# Patient Record
Sex: FEMALE | Race: WHITE | Employment: FULL TIME | URBAN - METROPOLITAN AREA
[De-identification: names, ages, dates, MRNs, and addresses within clinical notes are randomized per-mention and may not be internally consistent; named-entity substitution may affect disease eponyms.]

---

## 2017-01-13 ENCOUNTER — OFFICE VISIT (OUTPATIENT)
Dept: FAMILY MEDICINE CLINIC | Age: 27
End: 2017-01-13

## 2017-01-13 VITALS
HEIGHT: 69 IN | RESPIRATION RATE: 16 BRPM | BODY MASS INDEX: 28.58 KG/M2 | SYSTOLIC BLOOD PRESSURE: 122 MMHG | HEART RATE: 90 BPM | OXYGEN SATURATION: 98 % | DIASTOLIC BLOOD PRESSURE: 70 MMHG | TEMPERATURE: 100.6 F | WEIGHT: 193 LBS

## 2017-01-13 DIAGNOSIS — R68.89 FLU-LIKE SYMPTOMS: Primary | ICD-10-CM

## 2017-01-13 LAB
FLUAV+FLUBV AG NOSE QL IA.RAPID: NEGATIVE POS/NEG
FLUAV+FLUBV AG NOSE QL IA.RAPID: NEGATIVE POS/NEG
VALID INTERNAL CONTROL?: YES

## 2017-01-13 RX ORDER — HYDROCODONE BITARTRATE AND ACETAMINOPHEN 5; 325 MG/1; MG/1
TABLET ORAL
Qty: 12 TAB | Refills: 0 | Status: SHIPPED | OUTPATIENT
Start: 2017-01-13 | End: 2017-04-27 | Stop reason: ALTCHOICE

## 2017-01-13 RX ORDER — OSELTAMIVIR PHOSPHATE 75 MG/1
75 CAPSULE ORAL 2 TIMES DAILY
Qty: 10 CAP | Refills: 0 | Status: SHIPPED | OUTPATIENT
Start: 2017-01-13 | End: 2017-01-18

## 2017-01-13 NOTE — LETTER
NOTIFICATION RETURN TO WORK / SCHOOL 
 
1/13/2017 9:50 AM 
 
Ms. Caleb Cooper [address] [place] [address] To Whom It May Concern: 
 
Caleb Cooper is currently under the care of [address] [place] [address]. She will return to work/school on: 1/16/2017 If there are questions or concerns please have the patient contact our office. Sincerely, Juany Jean MD

## 2017-01-13 NOTE — PROGRESS NOTES
Toño German is a 31 y.o. female here for cold symptoms      1. Have you been to the ER, urgent care clinic or hospitalized since your last visit? NO.     2. Have you seen or consulted any other health care providers outside of the [address] [place] [address] since your last visit (Include any pap smears or colon screening)? NO      Do you have an Advanced Directive? NO    Would you like information on Advanced Directives?  NO

## 2017-01-13 NOTE — PATIENT INSTRUCTIONS
Rest, increase fluids, ibuprofen if needed for pain and fever in addition to hydrocodone-APAP if needed  Tamiflu twice daily x 5 days  Respiratory precautions  Follow up for new symptoms, worsening symptoms or failure to improve. Influenza (Flu): Care Instructions  Your Care Instructions  Influenza (flu) is an infection in the lungs and breathing passages. It is caused by the influenza virus. There are different strains, or types, of the flu virus from year to year. Unlike the common cold, the flu comes on suddenly and the symptoms, such as a cough, congestion, fever, chills, fatigue, aches, and pains, are more severe. These symptoms may last up to 10 days. Although the flu can make you feel very sick, it usually doesn't cause serious health problems. Home treatment is usually all you need for flu symptoms. But your doctor may prescribe antiviral medicine to prevent other health problems, such as pneumonia, from developing. Older people and those who have a long-term health condition, such as lung disease, are most at risk for having pneumonia or other health problems. Follow-up care is a key part of your treatment and safety. Be sure to make and go to all appointments, and call your doctor if you are having problems. Its also a good idea to know your test results and keep a list of the medicines you take. How can you care for yourself at home? · Get plenty of rest.  · Drink plenty of fluids, enough so that your urine is light yellow or clear like water. If you have kidney, heart, or liver disease and have to limit fluids, talk with your doctor before you increase the amount of fluids you drink. · Take an over-the-counter pain medicine if needed, such as acetaminophen (Tylenol), ibuprofen (Advil, Motrin), or naproxen (Aleve), to relieve fever, headache, and muscle aches. Read and follow all instructions on the label. No one younger than 20 should take aspirin.  It has been linked to Reye syndrome, a serious illness. · Do not smoke. Smoking can make the flu worse. If you need help quitting, talk to your doctor about stop-smoking programs and medicines. These can increase your chances of quitting for good. · Breathe moist air from a hot shower or from a sink filled with hot water to help clear a stuffy nose. · Before you use cough and cold medicines, check the label. These medicines may not be safe for young children or for people with certain health problems. · If the skin around your nose and lips becomes sore, put some petroleum jelly on the area. · To ease coughing:  ¨ Drink fluids to soothe a scratchy throat. ¨ Suck on cough drops or plain hard candy. ¨ Take an over-the-counter cough medicine that contains dextromethorphan to help you get some sleep. Read and follow all instructions on the label. ¨ Raise your head at night with an extra pillow. This may help you rest if coughing keeps you awake. · Take any prescribed medicine exactly as directed. Call your doctor if you think you are having a problem with your medicine. To avoid spreading the flu  · Wash your hands regularly, and keep your hands away from your face. · Stay home from school, work, and other public places until you are feeling better and your fever has been gone for at least 24 hours. The fever needs to have gone away on its own without the help of medicine. · Ask people living with you to talk to their doctors about preventing the flu. They may get antiviral medicine to keep from getting the flu from you. · To prevent the flu in the future, get a flu vaccine every fall. Encourage people living with you to get the vaccine. · Cover your mouth when you cough or sneeze. When should you call for help? Call 911 anytime you think you may need emergency care. For example, call if:  · You have severe trouble breathing. Call your doctor now or seek immediate medical care if:  · You have new or worse trouble breathing.   · You seem to be getting much sicker. · You feel very sleepy or confused. · You have a new or higher fever. · You get a new rash. Watch closely for changes in your health, and be sure to contact your doctor if:  · You begin to get better and then get worse. · You are not getting better after 1 week. Where can you learn more? Go to http://michael-berny.info/. Enter B538 in the search box to learn more about \"Influenza (Flu): Care Instructions. \"  Current as of: May 23, 2016  Content Version: 11.1  © 2749-0035 [employer]. Care instructions adapted under license by [employer] (which disclaims liability or warranty for this information). If you have questions about a medical condition or this instruction, always ask your healthcare professional. [address] any warranty or liability for your use of this information.

## 2017-01-13 NOTE — MR AVS SNAPSHOT
Visit Information Date & Time Provider Department Dept. Phone Encounter #  
 1[facility]13[facility]2017  9[phone] [id number] Upcoming Health Maintenance Date Due DTaP/Tdap/Td series (1 - Tdap) 3/28/2010 INFLUENZA AGE 9 TO ADULT 8/1/2016 PAP AKA CERVICAL CYTOLOGY 8/17/2019 Allergies as of 1/13/2017  Review Complete On: 1/13/2017 By: Jayson Mclaughlin MD  
  
 Severity Noted Reaction Type Reactions Other Plant, Animal, Environmental  07/19/2016    Ana Mariano Current Immunizations  Never Reviewed No immunizations on file. Not reviewed this visit You Were Diagnosed With   
  
 Codes Comments Flu-like symptoms    -  Primary ICD-10-CM: R68.89 ICD-9-CM: 780.99 Vitals BP Pulse Temp Resp Height(growth percentile) Weight(growth percentile) 122/70 (BP 1 Location: Left arm, BP Patient Position: Sitting) 90 (!) 100.6 °F (38.1 °C) (Oral) 16 5' 9\" (1.753 m) 193 lb (87.5 kg) SpO2 BMI Smoking Status 98% 28.5 kg/m2 Never Smoker BMI and BSA Data Body Mass Index Body Surface Area 28.5 kg/m 2 2.06 m 2 Preferred Pharmacy Pharmacy Name Phone [facility] [address] [place], [place] [address]. [phone] Your Updated Medication List  
  
   
This list is accurate as of: 1/13/17  9:53 AM.  Always use your most recent med list.  
  
  
  
  
 HYDROcodone-acetaminophen 5-325 mg per tablet Commonly known as:  Ranjana Parisian Take one tablet up to every 4 hours if needed for pain cough  
  
 oseltamivir 75 mg capsule Commonly known as:  TAMIFLU Take 1 Cap by mouth two (2) times a day for 5 days. Prescriptions Printed Refills HYDROcodone-acetaminophen (NORCO) 5-325 mg per tablet 0 Sig: Take one tablet up to every 4 hours if needed for pain cough Class: Print Prescriptions Sent to Pharmacy Refills oseltamivir (TAMIFLU) 75 mg capsule 0 Sig: Take 1 Cap by mouth two (2) times a day for 5 days. Class: Normal  
 Pharmacy: [facility]/pharmacy #[id number]- [address]  #: [phone] Route: Oral  
  
We Performed the Following AMB POC  INFLUENZA A/B TEST [76527 CPT(R)] Patient Instructions Rest, increase fluids, ibuprofen if needed for pain and fever in addition to hydrocodone-APAP if needed Tamiflu twice daily x 5 days Respiratory precautions Follow up for new symptoms, worsening symptoms or failure to improve. Influenza (Flu): Care Instructions Your Care Instructions Influenza (flu) is an infection in the lungs and breathing passages. It is caused by the influenza virus. There are different strains, or types, of the flu virus from year to year. Unlike the common cold, the flu comes on suddenly and the symptoms, such as a cough, congestion, fever, chills, fatigue, aches, and pains, are more severe. These symptoms may last up to 10 days. Although the flu can make you feel very sick, it usually doesn't cause serious health problems. Home treatment is usually all you need for flu symptoms. But your doctor may prescribe antiviral medicine to prevent other health problems, such as pneumonia, from developing. Older people and those who have a long-term health condition, such as lung disease, are most at risk for having pneumonia or other health problems. Follow-up care is a key part of your treatment and safety. Be sure to make and go to all appointments, and call your doctor if you are having problems. Its also a good idea to know your test results and keep a list of the medicines you take. How can you care for yourself at home? · Get plenty of rest. 
· Drink plenty of fluids, enough so that your urine is light yellow or clear like water.  If you have kidney, heart, or liver disease and have to limit fluids, talk with your doctor before you increase the amount of fluids you drink. · Take an over-the-counter pain medicine if needed, such as acetaminophen (Tylenol), ibuprofen (Advil, Motrin), or naproxen (Aleve), to relieve fever, headache, and muscle aches. Read and follow all instructions on the label. No one younger than 20 should take aspirin. It has been linked to Reye syndrome, a serious illness. · Do not smoke. Smoking can make the flu worse. If you need help quitting, talk to your doctor about stop-smoking programs and medicines. These can increase your chances of quitting for good. · Breathe moist air from a hot shower or from a sink filled with hot water to help clear a stuffy nose. · Before you use cough and cold medicines, check the label. These medicines may not be safe for young children or for people with certain health problems. · If the skin around your nose and lips becomes sore, put some petroleum jelly on the area. · To ease coughing: ¨ Drink fluids to soothe a scratchy throat. ¨ Suck on cough drops or plain hard candy. ¨ Take an over-the-counter cough medicine that contains dextromethorphan to help you get some sleep. Read and follow all instructions on the label. ¨ Raise your head at night with an extra pillow. This may help you rest if coughing keeps you awake. · Take any prescribed medicine exactly as directed. Call your doctor if you think you are having a problem with your medicine. To avoid spreading the flu · Wash your hands regularly, and keep your hands away from your face. · Stay home from school, work, and other public places until you are feeling better and your fever has been gone for at least 24 hours. The fever needs to have gone away on its own without the help of medicine. · Ask people living with you to talk to their doctors about preventing the flu. They may get antiviral medicine to keep from getting the flu from you. · To prevent the flu in the future, get a flu vaccine every fall. Encourage people living with you to get the vaccine. · Cover your mouth when you cough or sneeze. When should you call for help? Call 911 anytime you think you may need emergency care. For example, call if: 
· You have severe trouble breathing. Call your doctor now or seek immediate medical care if: 
· You have new or worse trouble breathing. · You seem to be getting much sicker. · You feel very sleepy or confused. · You have a new or higher fever. · You get a new rash. Watch closely for changes in your health, and be sure to contact your doctor if: 
· You begin to get better and then get worse. · You are not getting better after 1 week. Where can you learn more? Go to http://michael-berny.info/. Enter Q072 in the search box to learn more about \"Influenza (Flu): Care Instructions. \" Current as of: May 23, 2016 Content Version: 11.1 © 4387-1814 [employer]. Care instructions adapted under license by [employer] (which disclaims liability or warranty for this information). If you have questions about a medical condition or this instruction, always ask your healthcare professional. [address] any warranty or liability for your use of this information. Introducing [facility] & HEALTH SERVICES! Dear Casie Downing: Thank you for requesting a [employer] account. Our records indicate that you already have an active [employer] account. You can access your account anytime at [email or web address] [email or web address]/Lif[employer] Did you know that you can access your hospital and ER discharge instructions at any time in [employer]? You can also review all of your test results from your hospital stay or ER visit. Additional Information If you have questions, please visit the Frequently Asked Questions section of the [employer] website at [email or web address]/. Remember, MyChart is NOT to be used for urgent needs. For medical emergencies, dial 911. Now available from your iPhone and Android! Please provide this summary of care documentation to your next provider. Your primary care clinician is listed as Becca Paul. If you have any questions after today's visit, please call [phone].

## 2017-04-27 ENCOUNTER — OFFICE VISIT (OUTPATIENT)
Dept: FAMILY MEDICINE CLINIC | Age: 27
End: 2017-04-27

## 2017-04-27 VITALS
SYSTOLIC BLOOD PRESSURE: 110 MMHG | RESPIRATION RATE: 18 BRPM | DIASTOLIC BLOOD PRESSURE: 70 MMHG | HEIGHT: 69 IN | BODY MASS INDEX: 30.07 KG/M2 | TEMPERATURE: 98.6 F | HEART RATE: 60 BPM | OXYGEN SATURATION: 98 % | WEIGHT: 203 LBS

## 2017-04-27 DIAGNOSIS — J02.9 PHARYNGITIS, UNSPECIFIED ETIOLOGY: Primary | ICD-10-CM

## 2017-04-27 DIAGNOSIS — N92.0 MENORRHAGIA WITH REGULAR CYCLE: ICD-10-CM

## 2017-04-27 DIAGNOSIS — L63.9 ALOPECIA AREATA: ICD-10-CM

## 2017-04-27 LAB
S PYO AG THROAT QL: NEGATIVE
VALID INTERNAL CONTROL?: YES

## 2017-04-27 NOTE — MR AVS SNAPSHOT
Visit Information Date & Time Provider Department Dept. Phone Encounter #  
 4/27/2017 11:45 AM Kamlesh Virgen, [facility] [phone] [id number] Upcoming Health Maintenance Date Due DTaP/Tdap/Td series (1 - Tdap) 3/28/2010 INFLUENZA AGE 9 TO ADULT 8/1/2016 PAP AKA CERVICAL CYTOLOGY 8/17/2019 Allergies as of 4/27/2017  Review Complete On: 4/27/2017 By: Kamlesh Virgen MD  
  
 Severity Noted Reaction Type Reactions Other Plant, Animal, Environmental  07/19/2016    Ana Acosta Current Immunizations  Never Reviewed No immunizations on file. Not reviewed this visit You Were Diagnosed With   
  
 Codes Comments Pharyngitis, unspecified etiology    -  Primary ICD-10-CM: J02.9 ICD-9-CM: 050 Alopecia areata     ICD-10-CM: L63.9 ICD-9-CM: 704.01 Menorrhagia with regular cycle     ICD-10-CM: N92.0 ICD-9-CM: 626.2 Vitals BP Pulse Temp Resp Height(growth percentile) Weight(growth percentile) 110/70 60 98.6 °F (37 °C) 18 5' 9\" (1.753 m) 203 lb (92.1 kg) LMP SpO2 BMI Smoking Status 04/20/2017 98% 29.98 kg/m2 Never Smoker Vitals History BMI and BSA Data Body Mass Index Body Surface Area  
 29.98 kg/m 2 2.12 m 2 Preferred Pharmacy [facility] [address] [place] [address] [address]. [phone] Your Updated Medication List  
  
Notice  As of 4/27/2017 12:21 PM  
 You have not been prescribed any medications. We Performed the Following AMB POC RAPID STREP A [85435 CPT(R)] Patient Instructions You have been referred to dermatology. Please call one of the preferred providers listed below and schedule your appointment. Once you have scheduled your appointment, please call the office at [phone] leave the details of your appointment (provider you will be seeing, appointment date and time) on the voice mail. [facility] Dermatology Dr. Adiel Bernal.,  [place] , [place] [address] You have been referred to gynecology. Please call one of the preferred providers listed below and schedule your appointment. Once you have scheduled your appointment, please call the office at [phone] and leave the details of your appointment (provider you will be seeing, appointment date and time) on the voice mail. [facility] [address], [address] [place], [facility] 
[phone] [facility] SERVICES! Dear Ankita Shaw: Thank you for requesting a [employer] account. Our records indicate that you already have an active [employer] account. You can access your account anytime at [email or web address] [employer]ides Did you know that you can access your hospital and ER discharge instructions at any time in [employer]? You can also review all of your test results from your hospital stay or ER visit. Additional Information If you have questions, please visit the Frequently Asked Questions section of the [employer] website at [email or web address]/. Remember, [employer] is NOT to be used for urgent needs. For medical emergencies, dial 911. Now available from your iPhone and Android! Please provide this summary of care documentation to your next provider. Your primary care clinician is listed as Becca Paul. If you have any questions after today's visit, please call [phone].

## 2017-04-27 NOTE — PROGRESS NOTES
Parvez Mccrary is a 28 y.o. female here today with of hair loss, and acne. 1. Have you been to the ER, urgent care clinic since your last visit? Hospitalized since your last visit? No    2. Have you seen or consulted any other health care providers outside of the [address] [place] [address] since your last visit? Include any pap smears or colon screening.  No

## 2017-04-27 NOTE — PATIENT INSTRUCTIONS
You have been referred to dermatology. Please call one of the preferred providers listed below and schedule your appointment. Once you have scheduled your appointment, please call the office at [phone] leave the details of your appointment (provider you will be seeing, appointment date and time) on the voice mail. [facility] Dermatology   Dr. Jeter Seat    [address] [facility] [place] [address] , [place] [address]           You have been referred to gynecology. Please call one of the preferred providers listed below and schedule your appointment. Once you have scheduled your appointment, please call the office at [phone] and leave the details of your appointment (provider you will be seeing, appointment date and time) on the voice mail.       [facility]  [address], [place] [address], [address] [place] [address]  [phone]

## 2017-04-27 NOTE — PROGRESS NOTES
Assessment/Plan:    1. Pharyngitis, unspecified etiology  -rapid strep neg. Likely viral.  If no better in 3-4 days, call and will send in antibiotic  - AMB POC RAPID STREP A    2. Alopecia areata  -pt has appt with derm. 3. Menorrhagia with regular cycle  -pt to make appt with gyn    The plan was discussed with the patient. The patient verbalized understanding and is in agreement with the plan. All medication potential side effects were discussed with the patient. Health Maintenance:   Health Maintenance   Topic Date Due    DTaP/Tdap/Td series (1 - Tdap) 03/28/2010    INFLUENZA AGE 9 TO ADULT  08/01/2016    PAP AKA CERVICAL CYTOLOGY  08/17/2019       Jarocho Ponce is a 28 y.o. female and presents with Hair/Scalp Problem     Subjective:  Pt notes a few days ago she started having sore throat, L neck tenderness and saw white patches on her tonsils. No f/c.  +strep throat sick contact    Notes her alopecia has been worsening. Never saw derm as referred,but does have appt with derm 6/2017. Also requesting OCP for menorrhagia and to help with her acne. ROS:  Constitutional: No recent weight change. No weakness/fatigue. No f/c. Cardiovascular: No CP/palpitations. No ALVAREZ/orthopnea/PND. Respiratory: No cough/sputum, dyspnea, wheezing. Neurological: No seizures/numbness/weakness. No paresthesias. Psychiatric:  No depression, anxiety. The PSH, FH were reviewed. SH:  Social History   Substance Use Topics    Smoking status: Never Smoker    Smokeless tobacco: Never Used    Alcohol use Yes      Comment: occassionally       Medications/Allergies:  No current outpatient prescriptions on file prior to visit. No current facility-administered medications on file prior to visit.          Allergies   Allergen Reactions    Other Plant, Animal, Environmental Rash     Pat       Objective:  Visit Vitals    /70    Pulse 60    Temp 98.6 °F (37 °C)    Resp 18    Ht 5' 9\" (1.753 m)  Wt 203 lb (92.1 kg)    LMP 04/20/2017    SpO2 98%    BMI 29.98 kg/m2      Constitutional: Well developed, nourished, no distress, alert   HENT: Exterior ears and tympanic membranes normal bilaterally. Supple neck. No thyromegaly. +posterior cervical lymphadenopathy. Oropharynx clear and moist mucous membranes. No tonsillar exudates. Eyes: Conjunctiva normal. PERRL. CV: S1, S2.  RRR. No murmurs/rubs. No thrills palpated. No carotid bruits. Intact distal pulses. No edema. Pulm: No abnormalities on inspection. Clear to auscultation bilaterally. No wheezing/rhonchi. Normal effort. GI: Soft, nontender, nondistended. Normal active bowel sounds. Skin: Several patches of alopecia on crown       Labwork and Ancillary Studies:    CBC w/Diff  Lab Results   Component Value Date/Time    WBC 5.4 07/19/2016 09:33 AM    HGB 12.3 07/19/2016 09:33 AM    PLATELET 053 11/70/7521 09:33 AM         Basic Metabolic Profile/LFTs  Lab Results   Component Value Date/Time    Sodium 138 07/19/2016 09:33 AM    Potassium 4.6 07/19/2016 09:33 AM    Chloride 101 07/19/2016 09:33 AM    CO2 22 07/19/2016 09:33 AM    Glucose 84 07/19/2016 09:33 AM    BUN 12 07/19/2016 09:33 AM    Creatinine 0.72 07/19/2016 09:33 AM    BUN/Creatinine ratio 17 07/19/2016 09:33 AM    GFR est  07/19/2016 09:33 AM    GFR est non- 07/19/2016 09:33 AM    Calcium 9.5 07/19/2016 09:33 AM      Lab Results   Component Value Date/Time    ALT (SGPT) 15 07/19/2016 09:33 AM    AST (SGOT) 16 07/19/2016 09:33 AM    Alk.  phosphatase 78 07/19/2016 09:33 AM    Bilirubin, total 0.5 07/19/2016 09:33 AM       Cholesterol  Lab Results   Component Value Date/Time    Cholesterol, total 202 07/19/2016 09:33 AM    HDL Cholesterol 63 07/19/2016 09:33 AM    LDL, calculated 130 07/19/2016 09:33 AM    Triglyceride 45 07/19/2016 09:33 AM

## 2017-08-02 ENCOUNTER — OFFICE VISIT (OUTPATIENT)
Dept: FAMILY MEDICINE CLINIC | Age: 27
End: 2017-08-02

## 2017-08-02 VITALS
RESPIRATION RATE: 16 BRPM | HEIGHT: 69 IN | TEMPERATURE: 98.2 F | BODY MASS INDEX: 30.36 KG/M2 | DIASTOLIC BLOOD PRESSURE: 72 MMHG | WEIGHT: 205 LBS | OXYGEN SATURATION: 97 % | SYSTOLIC BLOOD PRESSURE: 110 MMHG | HEART RATE: 80 BPM

## 2017-08-02 DIAGNOSIS — R19.7 DIARRHEA, UNSPECIFIED TYPE: Primary | ICD-10-CM

## 2017-08-02 DIAGNOSIS — R10.84 GENERALIZED ABDOMINAL PAIN: ICD-10-CM

## 2017-08-02 RX ORDER — DICYCLOMINE HYDROCHLORIDE 20 MG/1
20 TABLET ORAL
Qty: 30 TAB | Refills: 0 | Status: SHIPPED | OUTPATIENT
Start: 2017-08-02 | End: 2021-04-14

## 2017-08-02 NOTE — MR AVS SNAPSHOT
Visit Information Date & Time Provider Department Dept. Phone Encounter #  
 8/2/2017  1:45 PM Eleni Diehl, 3 [facility] [phone] [id number] Upcoming Health Maintenance Date Due DTaP/Tdap/Td series (1 - Tdap) 3/28/2010 INFLUENZA AGE 9 TO ADULT 8/1/2017 PAP AKA CERVICAL CYTOLOGY 8/17/2019 Allergies as of 8/2/2017  Review Complete On: 8/2/2017 By: Eleni Diehl MD  
  
 Severity Noted Reaction Type Reactions Other Plant, Animal, Environmental  07/19/2016    Ana Machado Current Immunizations  Never Reviewed No immunizations on file. Not reviewed this visit You Were Diagnosed With   
  
 Codes Comments Diarrhea, unspecified type    -  Primary ICD-10-CM: R19.7 ICD-9-CM: 787.91 Generalized abdominal pain     ICD-10-CM: R10.84 ICD-9-CM: 789.07 Vitals BP Pulse Temp Resp Height(growth percentile) Weight(growth percentile) 110/72 80 98.2 °F (36.8 °C) 16 5' 9\" (1.753 m) 205 lb (93 kg) LMP SpO2 BMI Smoking Status 07/29/2017 97% 30.27 kg/m2 Never Smoker Vitals History BMI and BSA Data Body Mass Index Body Surface Area  
 30.27 kg/m 2 2.13 m 2 Preferred Pharmacy Pharmacy Name Phone [facility] [address] [place] [address] [facility]. [phone] Your Updated Medication List  
  
   
This list is accurate as of: 8/2/17  2:34 PM.  Always use your most recent med list.  
  
  
  
  
 dicyclomine 20 mg tablet Commonly known as:  BENTYL Take 1 Tab by mouth every eight (8) hours as needed. Prescriptions Sent to Pharmacy Refills  
 dicyclomine (BENTYL) 20 mg tablet 0 Sig: Take 1 Tab by mouth every eight (8) hours as needed. Class: Normal  
 Pharmacy: [facility]/pharmacy #[id number]- [address] Ph #: [phone] Route: Oral  
  
We Performed the Following REFERRAL TO GASTROENTEROLOGY [BBQ38 Custom] To-Do List   
 08/02/2017 Lab:  CBC WITH AUTOMATED DIFF   
  
 08/02/2017 Lab:  CELIAC ANTIBODY PROFILE   
  
 08/02/2017 Imaging:  CT ABD PELV W WO CONT   
  
 08/02/2017 Lab:  HEPATIC FUNCTION PANEL   
  
 08/02/2017 Lab:  LIPASE   
  
 08/02/2017 Lab:  METABOLIC PANEL, BASIC Referral Information Referral ID Referred By Referred To  
  
 [id number] [facility] [address] [place], [address] [address] Phone: [phone] Fax: [phone] Visits Status Start Date End Date 1 New Request 8/2/17 8/2/18 If your referral has a status of pending review or denied, additional information will be sent to support the outcome of this decision. Patient Instructions Abdominal Pain: Care Instructions Your Care Instructions Abdominal pain has many possible causes. Some aren't serious and get better on their own in a few days. Others need more testing and treatment. If your pain continues or gets worse, you need to be rechecked and may need more tests to find out what is wrong. You may need surgery to correct the problem. Don't ignore new symptoms, such as fever, nausea and vomiting, urination problems, pain that gets worse, and dizziness. These may be signs of a more serious problem. Your doctor may have recommended a follow-up visit in the next 8 to 12 hours. If you are not getting better, you may need more tests or treatment. The doctor has checked you carefully, but problems can develop later. If you notice any problems or new symptoms, get medical treatment right away. Follow-up care is a key part of your treatment and safety. Be sure to make and go to all appointments, and call your doctor if you are having problems. It's also a good idea to know your test results and keep a list of the medicines you take. How can you care for yourself at home? · Rest until you feel better. · To prevent dehydration, drink plenty of fluids, enough so that your urine is light yellow or clear like water. Choose water and other caffeine-free clear liquids until you feel better. If you have kidney, heart, or liver disease and have to limit fluids, talk with your doctor before you increase the amount of fluids you drink. · If your stomach is upset, eat mild foods, such as rice, dry toast or crackers, bananas, and applesauce. Try eating several small meals instead of two or three large ones. · Wait until 48 hours after all symptoms have gone away before you have spicy foods, alcohol, and drinks that contain caffeine. · Do not eat foods that are high in fat. · Avoid anti-inflammatory medicines such as aspirin, ibuprofen (Advil, Motrin), and naproxen (Aleve). These can cause stomach upset. Talk to your doctor if you take daily aspirin for another health problem. When should you call for help? Call 911 anytime you think you may need emergency care. For example, call if: 
· You passed out (lost consciousness). · You pass maroon or very bloody stools. · You vomit blood or what looks like coffee grounds. · You have new, severe belly pain. Call your doctor now or seek immediate medical care if: 
· Your pain gets worse, especially if it becomes focused in one area of your belly. · You have a new or higher fever. · Your stools are black and look like tar, or they have streaks of blood. · You have unexpected vaginal bleeding. · You have symptoms of a urinary tract infection. These may include: 
¨ Pain when you urinate. ¨ Urinating more often than usual. 
¨ Blood in your urine. · You are dizzy or lightheaded, or you feel like you may faint. Watch closely for changes in your health, and be sure to contact your doctor if: 
· You are not getting better after 1 day (24 hours). Where can you learn more? Go to http://michael-berny.info/. Enter C353 in the search box to learn more about \"Abdominal Pain: Care Instructions. \" Current as of: March 20, 2017 Content Version: 11.3 © 1833-1132 [employer] Care instructions adapted under license by [employer] (which disclaims liability or warranty for this information). If you have questions about a medical condition or this instruction, always ask your healthcare professional. [address] any warranty or liability for your use of this information. Introducing [facility]! Dear Donato Hester: Thank you for requesting a [employer] account. Our records indicate that you already have an active [employer] account. You can access your account anytime at [email or web address] Did you know that you can access your hospital and ER discharge instructions at any time in [employer]? You can also review all of your test results from your hospital stay or ER visit. Additional Information If you have questions, please visit the Frequently Asked Questions section of the [employer] website at [email or web address] Remember, [employer] is NOT to be used for urgent needs. For medical emergencies, dial 911. Now available from your iPhone and Android! Please provide this summary of care documentation to your next provider. Your primary care clinician is listed as Becca Paul. If you have any questions after today's visit, please call [phone].

## 2017-08-02 NOTE — PATIENT INSTRUCTIONS

## 2017-08-02 NOTE — PROGRESS NOTES
Assessment/Plan:    *Diagnoses and all orders for this visit:    1. Diarrhea, unspecified type  -     CT ABD PELV W WO CONT; Future  -     REFERRAL TO GASTROENTEROLOGY  -     dicyclomine (BENTYL) 20 mg tablet; Take 1 Tab by mouth every eight (8) hours as needed. 2. Generalized abdominal pain  -     CT ABD PELV W WO CONT; Future  -     REFERRAL TO GASTROENTEROLOGY  -     dicyclomine (BENTYL) 20 mg tablet; Take 1 Tab by mouth every eight (8) hours as needed. -     CELIAC ANTIBODY PROFILE; Future  -     METABOLIC PANEL, BASIC; Future  -     CBC WITH AUTOMATED DIFF; Future  -     HEPATIC FUNCTION PANEL; Future  -     LIPASE; Future      Pt will call to make CT and GI appt. Will do a trial of Bentyl. The plan was discussed with the patient. The patient verbalized understanding and is in agreement with the plan. All medication potential side effects were discussed with the patient.    -------------------------------------------------------------------------------------------------------------------        Daisha Woods is a 28 y.o. female and presents with Abdominal Pain         Subjective:  Pt has been dealing with GI issues, feeling very bloated, having terrible pain, 7/10, which can present as intermittent sharp pain in waves then settles down to a constant dull aching, couplwd with diarrhea (1-2 episodes each morning). This has been going on x 1 week now. She reports having GI problems over the years with bloating, noticing certain foods will bring on her symptoms but episodes have been infrequent. Feels, the episodes have been occurring more often and this last week, has been the worst.    ROS:  Constitutional: No recent weight change. No weakness/fatigue. No f/c. Skin: No rashes, change in nails/hair, itching   HENT: No HA, dizziness. No hearing loss/tinnitus. No nasal congestion/discharge. Eyes: No change in vision, double/blurred vision or eye pain/redness.     Cardiovascular: No CP/palpitations. No ALVAREZ/orthopnea/PND. Respiratory: No cough/sputum, dyspnea, wheezing. Gastointestinal: No dysphagia, reflux. No n/v. No constipation/diarrhea. No melena/rectal bleeding. Genitourinary: No dysuria, urinary hesitancy, nocturia, hematuria. No incontinence. Musculoskeletal: No joint pain/stiffness. No muscle pain/tenderness. Endo: No heat/cold intolerance, no polyuria/polydypsia. Heme: No h/o anemia. No easy bleeding/bruising. Allergy/Immunology: No seasonal rhinitis. Denies frequent colds, sinus/ear infections. Neurological: No seizures/numbness/weakness. No paresthesias. Psychiatric:  No depression, anxiety. The problem list was updated as a part of today's visit. Patient Active Problem List   Diagnosis Code    Alopecia areata L63.9       The PSH, FH were reviewed. SH:  Social History   Substance Use Topics    Smoking status: Never Smoker    Smokeless tobacco: Never Used    Alcohol use Yes      Comment: occassionally       Medications/Allergies:  No current outpatient prescriptions on file prior to visit. No current facility-administered medications on file prior to visit. Allergies   Allergen Reactions    Other Plant, Animal, Environmental Rash     Pat         Health Maintenance:   Health Maintenance   Topic Date Due    DTaP/Tdap/Td series (1 - Tdap) 03/28/2010    INFLUENZA AGE 9 TO ADULT  08/01/2017    PAP AKA CERVICAL CYTOLOGY  08/17/2019       Objective:  Visit Vitals    /72    Pulse 80    Temp 98.2 °F (36.8 °C)    Resp 16    Ht 5' 9\" (1.753 m)    Wt 205 lb (93 kg)    LMP 07/29/2017    SpO2 97%    BMI 30.27 kg/m2          Nurses notes and VS reviewed.       Physical Examination: General appearance - alert, well appearing, and in no distress  Chest - clear to auscultation, no wheezes, rales or rhonchi, symmetric air entry  Heart - normal rate, regular rhythm, normal S1, S2, no murmurs, rubs, clicks or gallops  Abdomen - tenderness noted diffusely across abd. Labwork and Ancillary Studies:    CBC w/Diff  Lab Results   Component Value Date/Time    WBC 5.4 07/19/2016 09:33 AM    HGB 12.3 07/19/2016 09:33 AM    PLATELET 855 20/67/3866 09:33 AM         Basic Metabolic Profile  Lab Results   Component Value Date/Time    Sodium 138 07/19/2016 09:33 AM    Potassium 4.6 07/19/2016 09:33 AM    Chloride 101 07/19/2016 09:33 AM    CO2 22 07/19/2016 09:33 AM    Glucose 84 07/19/2016 09:33 AM    BUN 12 07/19/2016 09:33 AM    Creatinine 0.72 07/19/2016 09:33 AM    BUN/Creatinine ratio 17 07/19/2016 09:33 AM    GFR est  07/19/2016 09:33 AM    GFR est non- 07/19/2016 09:33 AM    Calcium 9.5 07/19/2016 09:33 AM         LFT  Lab Results   Component Value Date/Time    ALT (SGPT) 15 07/19/2016 09:33 AM    AST (SGOT) 16 07/19/2016 09:33 AM    Alk.  phosphatase 78 07/19/2016 09:33 AM    Bilirubin, total 0.5 07/19/2016 09:33 AM         Cholesterol  Lab Results   Component Value Date/Time    Cholesterol, total 202 07/19/2016 09:33 AM    HDL Cholesterol 63 07/19/2016 09:33 AM    LDL, calculated 130 07/19/2016 09:33 AM    Triglyceride 45 07/19/2016 09:33 AM

## 2017-08-02 NOTE — PROGRESS NOTES
James Pittman is a 28 y.o. female here today with complaints of stomach pain. 1. Have you been to the ER, urgent care clinic since your last visit? Hospitalized since your last visit? No    2. Have you seen or consulted any other health care providers outside of the [address] [place] [address] since your last visit? Include any pap smears or colon screening.  No

## 2017-08-03 LAB
A-G RATIO,AGRAT: 1.3 RATIO (ref 1.1–2.6)
ABSOLUTE LYMPHOCYTE COUNT, 10803: 1.6 K/UL (ref 1–4.8)
ALBUMIN SERPL-MCNC: 4.3 G/DL (ref 3.5–5)
ALP SERPL-CCNC: 83 U/L (ref 25–115)
ALT SERPL-CCNC: 14 U/L (ref 5–40)
ANION GAP SERPL CALC-SCNC: 18 MMOL/L
AST SERPL W P-5'-P-CCNC: 21 U/L (ref 10–37)
BASOPHILS # BLD: 0 K/UL (ref 0–0.2)
BASOPHILS NFR BLD: 1 % (ref 0–2)
BILIRUB SERPL-MCNC: 0.5 MG/DL (ref 0.2–1.2)
BILIRUBIN, DIRECT,CBIL: <0.2 MG/DL (ref 0–0.3)
BUN SERPL-MCNC: 11 MG/DL (ref 6–22)
CALCIUM SERPL-MCNC: 9.1 MG/DL (ref 8.4–10.5)
CHLORIDE SERPL-SCNC: 99 MMOL/L (ref 98–110)
CO2 SERPL-SCNC: 23 MMOL/L (ref 20–32)
CREAT SERPL-MCNC: 0.7 MG/DL (ref 0.5–1.2)
EOSINOPHIL # BLD: 0.1 K/UL (ref 0–0.5)
EOSINOPHIL NFR BLD: 2 % (ref 0–6)
ERYTHROCYTE [DISTWIDTH] IN BLOOD BY AUTOMATED COUNT: 14 % (ref 10–16)
GFRAA, 66117: >60
GFRNA, 66118: >60
GLOBULIN,GLOB: 3.2 G/DL (ref 2–4)
GLUCOSE SERPL-MCNC: 83 MG/DL (ref 65–99)
GRANULOCYTES,GRANS: 63 % (ref 40–75)
HCT VFR BLD AUTO: 38.6 % (ref 35.1–46.5)
HGB BLD-MCNC: 12.4 G/DL (ref 11.7–15.5)
LIPASE SERPL-CCNC: 28 U/L (ref 7–60)
LYMPHOCYTES, LYMLT: 27 % (ref 27–45)
MCH RBC QN AUTO: 29 PG (ref 26–34)
MCHC RBC AUTO-ENTMCNC: 32 G/DL (ref 32–36)
MCV RBC AUTO: 90 FL (ref 80–95)
MONOCYTES # BLD: 0.4 K/UL (ref 0.1–0.9)
MONOCYTES NFR BLD: 7 % (ref 3–9)
NEUTROPHILS # BLD AUTO: 3.9 K/UL (ref 1.8–7.7)
PLATELET # BLD AUTO: 280 K/UL (ref 140–440)
PMV BLD AUTO: 9.6 FL (ref 6–10.8)
POTASSIUM SERPL-SCNC: 4.3 MMOL/L (ref 3.5–5.5)
PROT SERPL-MCNC: 7.5 G/DL (ref 6.4–8.3)
RBC # BLD AUTO: 4.28 M/UL (ref 3.8–5.2)
SODIUM SERPL-SCNC: 140 MMOL/L (ref 133–145)
WBC # BLD AUTO: 6.2 K/UL (ref 4–11)

## 2017-08-06 LAB
GLIADIN AB, IGA,GLIAM: 6 UNITS
GLIADIN AB, IGG,GLIAG: 3 UNITS
Lab: 280 MG/DL
T-TRANSGLUTAMINASE, IGA, 164643: <2 U/ML
TTG IGG SER-ACNC: <2 U/ML

## 2017-08-18 ENCOUNTER — DOCUMENTATION ONLY (OUTPATIENT)
Dept: FAMILY MEDICINE CLINIC | Age: 27
End: 2017-08-18

## 2017-08-18 DIAGNOSIS — N28.9 RENAL LESION: ICD-10-CM

## 2017-08-18 DIAGNOSIS — N94.9 ADNEXAL CYST: Primary | ICD-10-CM

## 2017-08-18 NOTE — PROGRESS NOTES
Patient notified per dr Kina Dong review of Ct scan ab/pelvis in general looks good does have what maybe a cyst on the L adnexa near ovary and what looks like a lt kidney cyst and a ultrasound id recommended to take a better look at both in more depth patient given name and codes will call edilberto and scheduled

## 2017-08-30 DIAGNOSIS — R19.7 DIARRHEA, UNSPECIFIED TYPE: ICD-10-CM

## 2017-08-30 DIAGNOSIS — R10.84 GENERALIZED ABDOMINAL PAIN: ICD-10-CM

## 2017-09-01 DIAGNOSIS — N28.9 KIDNEY LESION: Primary | ICD-10-CM

## 2017-09-11 DIAGNOSIS — N28.9 RENAL LESION: ICD-10-CM

## 2017-09-18 DIAGNOSIS — N94.9 ADNEXAL CYST: ICD-10-CM

## 2018-08-16 ENCOUNTER — TELEPHONE (OUTPATIENT)
Dept: FAMILY MEDICINE CLINIC | Age: 28
End: 2018-08-16

## 2018-08-16 NOTE — TELEPHONE ENCOUNTER
I called patient this report is from 8/24/2017 she was told about an ultrasound of her kidneys but never  Received a  Call to set up an appointment please advise I only see a order for an Mri of abdomen

## 2018-08-16 NOTE — TELEPHONE ENCOUNTER
I reviewed the US of her ovaries. All looks fine, there was some small fluid near the LT ovary which probably was from the cyst that likely ruptured. This is fine. No other cysts are noted. Nothing further needed. Just waiting on the US of the kidneys. ...

## 2018-08-20 ENCOUNTER — TELEPHONE (OUTPATIENT)
Dept: FAMILY MEDICINE CLINIC | Age: 28
End: 2018-08-20

## 2018-08-20 NOTE — TELEPHONE ENCOUNTER
I received the first mentioned report on my desk like it had just been done.   I apologize as I did not notice the

## 2018-08-21 NOTE — TELEPHONE ENCOUNTER
I did not notice that this was from a year ago. She should have the MRI as this was recommended by the radiologist.  She can discuss thsi with her pcp (Dr. Delbert Allen) if she wishes.

## 2018-08-21 NOTE — TELEPHONE ENCOUNTER
Spoke with patient about kidney results which recommended a MRI since there is a hypodense (darker spot vs a cyst it lighter spot then the surrounding tissue this is just to be cautious patient voiced understanding and will call to schedule follow up with Dr kerr unable to do now as she is at a work conference will call back

## 2018-10-29 ENCOUNTER — OFFICE VISIT (OUTPATIENT)
Dept: FAMILY MEDICINE CLINIC | Age: 28
End: 2018-10-29

## 2018-10-29 VITALS
BODY MASS INDEX: 30.45 KG/M2 | DIASTOLIC BLOOD PRESSURE: 68 MMHG | HEIGHT: 69 IN | SYSTOLIC BLOOD PRESSURE: 110 MMHG | RESPIRATION RATE: 14 BRPM | HEART RATE: 60 BPM | WEIGHT: 205.6 LBS | OXYGEN SATURATION: 98 % | TEMPERATURE: 98.2 F

## 2018-10-29 DIAGNOSIS — R30.0 DYSURIA: Primary | ICD-10-CM

## 2018-10-29 DIAGNOSIS — Z11.3 SCREEN FOR STD (SEXUALLY TRANSMITTED DISEASE): ICD-10-CM

## 2018-10-29 LAB
BILIRUB UR QL STRIP: NEGATIVE
GLUCOSE UR-MCNC: NEGATIVE MG/DL
KETONES P FAST UR STRIP-MCNC: NEGATIVE MG/DL
PH UR STRIP: 6 [PH] (ref 4.6–8)
PROT UR QL STRIP: NEGATIVE
SP GR UR STRIP: 1.03 (ref 1–1.03)
UA UROBILINOGEN AMB POC: NORMAL (ref 0.2–1)
URINALYSIS CLARITY POC: CLEAR
URINALYSIS COLOR POC: YELLOW
URINE BLOOD POC: NEGATIVE
URINE LEUKOCYTES POC: NEGATIVE
URINE NITRITES POC: NEGATIVE

## 2018-10-29 RX ORDER — CIPROFLOXACIN 250 MG/1
250 TABLET, FILM COATED ORAL EVERY 12 HOURS
Qty: 6 TAB | Refills: 0 | Status: SHIPPED | OUTPATIENT
Start: 2018-10-29 | End: 2018-11-01

## 2018-10-29 NOTE — PROGRESS NOTES
Chief Complaint Patient presents with  Urinary Pain ASSESSMENT/PLAN 1. Dysuria 
-will treat with cipro as she still has sx despite taking nitrofurantoin 
- CULTURE, URINE; Future 2. Screen for STD (sexually transmitted disease) 
- CT/NG/T.VAGINALIS AMPLIFICATION; Future Push fluids, may use Pyridium OTC prn. Call or return to clinic prn if these symptoms worsen or fail to improve as anticipated. The plan was discussed with the patient. The patient verbalized understanding and is in agreement with the plan. All medication potential side effects were discussed with the patient. SUBJECTIVE: Jerry Robison is a 33 y.o. female who wants to be checked for UTI. States about a month ago, was seen at urgent care and given antibiotics (?bactrim). However, she didn't take antibiotics as prescribed due to GI discomfort and she thought azo was treating her. She was then changed to a different antibiotic (completed 13 of 14 pills). This improved her sx somewhat, but still continues to have some minimal discomfort. No f/c. She is requesting STD testing. OBJECTIVE:  
Visit Vitals /68 (BP 1 Location: Left arm, BP Patient Position: Sitting) Pulse 60 Temp 98.2 °F (36.8 °C) (Oral) Resp 14 Ht 5' 9\" (1.753 m) Wt 205 lb 9.6 oz (93.3 kg) LMP 10/21/2018 SpO2 98% BMI 30.36 kg/m² Gen: Appears well, in no apparent distress. CV: RRR Pulm: CTA bilaterally. No wheezes/rales/crackles. : No CVA tenderness or inguinal adenopathy noted. Urine dipstick shows negative for all components.   
 
 
 
 
Jenny Ramirez MD

## 2018-10-29 NOTE — PROGRESS NOTES
Akin Pierce is a 33 y.o. female (: 1990) presenting to address: 
Patient states that she is experiencing discomfort and \"tingling\" when voiding. She went to [facility] 10/08/18 for the same symptoms and was prescribed Phenazopyridine 200mg, which she stopped taking around 10/15/18 because it was causing \"stomach pain. \" Patient stated that the symptoms started again and that she wanted to come in and be checked again for a UTI. Chief Complaint Patient presents with  Urinary Pain Vitals:  
 10/29/18 1011 BP: 110/68 Pulse: 60 Resp: 14 Temp: 98.2 °F (36.8 °C) TempSrc: Oral  
SpO2: 98% Weight: 205 lb 9.6 oz (93.3 kg) Height: 5' 9\" (1.753 m) PainSc:   0 - No pain LMP: 10/21/2018 Hearing/Vision: No exam data present Learning Assessment:  
 
Learning Assessment 2016 HIGHEST LEVEL OF EDUCATION - PRIMARY LEARNER  SOME COLLEGE  
LEARNER PREFERENCE PRIMARY READING Depression Screening: PHQ over the last two weeks 10/29/2018 Little interest or pleasure in doing things Not at all Feeling down, depressed, irritable, or hopeless Not at all Total Score PHQ 2 0 Fall Risk Assessment:  
No flowsheet data found. Abuse Screening:  
 
Abuse Screening Questionnaire 2016 Do you ever feel afraid of your partner? Negrita Fajardo Are you in a relationship with someone who physically or mentally threatens you? Negrita Fajardo Is it safe for you to go home? Dany Zarate Coordination of Care Questionaire: 1. Have you been to the ER, urgent care clinic since your last visit? Hospitalized since your last visit? YES [facility] for S/S of UTI. 2. Have you seen or consulted any other health care providers outside of the [address] [place] [address] since your last visit? Include any pap smears or colon screening. NO Advanced Directive: 1. Do you have an Advanced Directive? NO 
 
2. Would you like information on Advanced Directives?  NO

## 2018-10-30 LAB
CHLAMYDIA AMPLIFIED URINE: NEGATIVE
GC AMPLIFIED URINE,919: NEGATIVE
RESULT: NORMAL
TRICH NUCU, 17621: NEGATIVE

## 2019-10-29 PROBLEM — Z34.90 PREGNANCY: Status: ACTIVE | Noted: 2019-10-29

## 2019-10-29 PROBLEM — Z98.891 PREVIOUS CESAREAN SECTION: Status: ACTIVE | Noted: 2019-10-29
